# Patient Record
Sex: FEMALE | Race: BLACK OR AFRICAN AMERICAN
[De-identification: names, ages, dates, MRNs, and addresses within clinical notes are randomized per-mention and may not be internally consistent; named-entity substitution may affect disease eponyms.]

---

## 2018-04-22 NOTE — EMERGENCY ROOM REPORT
History of Present Illness


General


Chief Complaint:  Upper Respiratory Illness


Source:  Patient





Present Illness


HPI


28-year-old female with no sig pmhx, p/w cough and chest pain for one week.  

Patient has had a nonproductive cough, now progressed to having chest pain. 

Localized to substernal area, no radiation to back or other areas, sharp in 

nature, gradual in onset, multiple episodes, worsened with taking a deep 

breath.. + Shortness of breath. 


Patient also states that her bilateral lower extremities have been crampy, 

feeling sore as if she has worked out, not one worse than the other, denies any 

swelling of her lower extremities


This is the first occurrence of chest pain. 


Denies fever, chills, cough, abd pain. Denies trauma.


Denies cardiac history, smoking, or family history of cardiac disease at a 

young age.


Patient admits to taking OCPs.  Denies history of DVT/PE, recent surgeries, 

prolonged immobilzation, malignancy.


Allergies:  


Coded Allergies:  


     No Known Allergies (Unverified , 18)





Patient History


Past Medical History:  see triage record


Past Surgical History:  none


Pertinent Family History:  none


Reviewed Nursing Documentation:  PMH: Agreed; PSxH: Agreed





Nursing Documentation-PMH


Past Medical History:  No Stated History





Review of Systems


All Other Systems:  negative except mentioned in HPI





Physical Exam





Vital Signs








  Date Time  Temp Pulse Resp B/P (MAP) Pulse Ox O2 Delivery O2 Flow Rate FiO2


 


18 13:47 98.3 105 20 130/89 97 Room Air  





 98.2       








Sp02 EP Interpretation:  reviewed, normal


General Appearance:  alert, GCS 15, non-toxic, mild distress


Head:  normocephalic, atraumatic


Eyes:  bilateral eye normal inspection, bilateral eye PERRL, bilateral eye EOMI


ENT:  normal ENT inspection, normal pharynx, normal voice, moist mucus membranes


Neck:  normal inspection, full range of motion, supple


Respiratory:  normal inspection, lungs clear, normal breath sounds, no 

respiratory distress, no retraction, no wheezing, speaking full sentences, 

chest symmetrical


Cardiovascular #1:  normal peripheral pulses, no edema, tachycardia


Cardiovascular #2:  2+ radial (R), 2+ radial (L)


Gastrointestinal:  normal inspection, non tender, soft, non-distended, no 

guarding


Musculoskeletal:  other - There is no calf tenderness or swelling, full range 

of motion, no signs of trauma


Neurologic:  normal inspection, alert, oriented x3, responsive, motor strength/

tone normal, sensory intact, normal gait, speech normal


Psychiatric:  normal inspection, judgement/insight normal, memory normal


Skin:  normal inspection, normal color, no rash, warm/dry, well hydrated, 

normal turgor





Medical Decision Making


Diagnostic Impression:  


 Primary Impression:  


 Cough


 Additional Impression:  


 Chest pain


ER Course


28-year-old female with no sig pmhx p/w chest pain





DDX: 


Musculoskeletal CP/costochondritis vs. PE versus pneumothorax vs. gastritis/

GERD 


ACS less likely given age/history





Plan: 


Labs, d-dimer, EKG, CXR, consider CT angiogram





ER course:


Patient has been slightly tachycardic during ED stay, 100-102.  States that she 

is very anxious and she has fear for emergency rooms as it reminds her that her 

grandmother had .  However she is calm and speaking with her sister at 

bedside, no chest pain no shortness of breath.  Labs are normal, chest x-ray 

normal, d-dimer is negative.








Disposition:


Patient will be discharged to home 


Strict precautions discussed with patient on when to emergently return to the ED

: this includes worsening/severe chest pain, palpitations, shortness of breath, 

syncopal episodes, fever or chills, which may indicate severe illness. Patient 

verbalized understanding.


Patient instructed to follow up with their PMD within the next 2 days. Patient 

agrees with plan.





Please note that this Emergency Department Report was dictated using K[employer] technology software, occasionally this can lead to 

erroneous entry secondary to interpretation by the dictation equipment.





EKG Diagnostic Results


EP Interpretation: Yes


Rate: tachy


Rhythm: NSR


ST Segments: No acute changes 


ASA given to patient: no





Rhythm Strip


EP Interpretation: Yes


Rate: 112


Rhythm: NSR, no PVCs, no ectopy








Chest X-ray


CXR: Ordered: Yes


1 view


Indication: Chest pain


EP interpretation: Yes


Interpretation: No consolidation, no effusion, no PTX, no acute cardiopulmonary 

disease


Impression: No acute disease





Electronically signed by Kelli Keys MD





Laboratory Tests








Test


  18


14:00 18


14:15


 


Urine Color Pale yellow   


 


Urine Appearance Clear   


 


Urine pH 6 (4.5-8.0)   


 


Urine Specific Gravity


  1.010


(1.005-1.035) 


 


 


Urine Protein


  Negative


(NEGATIVE) 


 


 


Urine Glucose (UA)


  Negative


(NEGATIVE) 


 


 


Urine Ketones


  Negative


(NEGATIVE) 


 


 


Urine Occult Blood


  2+ (NEGATIVE)


H 


 


 


Urine Nitrite


  Negative


(NEGATIVE) 


 


 


Urine Bilirubin


  Negative


(NEGATIVE) 


 


 


Urine Urobilinogen


  Normal MG/DL


(0.0-1.0) 


 


 


Urine Leukocyte Esterase


  Negative


(NEGATIVE) 


 


 


Urine RBC


  2-4 /HPF (0 -


2)  H 


 


 


Urine WBC


  0-2 /HPF (0 -


2) 


 


 


Urine Squamous Epithelial


Cells Occasional


/LPF 


 


 


Urine Bacteria


  Occasional


/HPF (NONE) 


 


 


Urine HCG, Qualitative


  Negative


(NEGATIVE) 


 


 


White Blood Count


  


  8.4 K/UL


(4.8-10.8)


 


Red Blood Count


  


  4.78 M/UL


(4.20-5.40)


 


Hemoglobin


  


  14.0 G/DL


(12.0-16.0)


 


Hematocrit


  


  42.0 %


(37.0-47.0)


 


Mean Corpuscular Volume  88 FL (80-99)  


 


Mean Corpuscular Hemoglobin


  


  29.3 PG


(27.0-31.0)


 


Mean Corpuscular Hemoglobin


Concent 


  33.3 G/DL


(32.0-36.0)


 


Red Cell Distribution Width


  


  12.0 %


(11.6-14.8)


 


Platelet Count


  


  335 K/UL


(150-450)


 


Mean Platelet Volume


  


  6.8 FL


(6.5-10.1)


 


Neutrophils (%) (Auto)


  


  73.1 %


(45.0-75.0)


 


Lymphocytes (%) (Auto)


  


  18.5 %


(20.0-45.0)  L


 


Monocytes (%) (Auto)


  


  6.9 %


(1.0-10.0)


 


Eosinophils (%) (Auto)


  


  0.4 %


(0.0-3.0)


 


Basophils (%) (Auto)


  


  1.0 %


(0.0-2.0)


 


Prothrombin Time


  


  10.0 SEC


(9.30-11.50)


 


Prothrombin Time INR  1.0 (0.9-1.1)  


 


PTT


  


  26 SEC (23-33)


 


 


D-Dimer


  


  0.21 mg/L FEU


(0.00-0.49)


 


Sodium Level


  


  141 MMOL/L


(136-145)


 


Potassium Level


  


  3.4 MMOL/L


(3.5-5.1)  L


 


Chloride Level


  


  104 MMOL/L


()


 


Carbon Dioxide Level


  


  24 MMOL/L


(21-32)


 


Anion Gap


  


  13 mmol/L


(5-15)


 


Blood Urea Nitrogen


  


  12 mg/dL


(7-18)


 


Creatinine


  


  0.8 MG/DL


(0.55-1.30)


 


Estimate Glomerular


Filtration Rate 


  > 60 mL/min


(>60)


 


Glucose Level


  


  112 MG/DL


()  H


 


Calcium Level


  


  9.7 MG/DL


(8.5-10.1)


 


Total Bilirubin


  


  0.4 MG/DL


(0.2-1.0)


 


Aspartate Amino Transferase


(AST) 


  12 U/L (15-37)


L


 


Alanine Aminotransferase (ALT)


  


  25 U/L (12-78)


 


 


Alkaline Phosphatase


  


  86 U/L


()


 


Troponin I


  


  0.000 ng/mL


(0.000-0.056)


 


Pro-B-Type Natriuretic Peptide


  


  < 5 pg/mL


(0-125)


 


Total Protein


  


  8.5 G/DL


(6.4-8.2)  H


 


Albumin


  


  4.1 G/DL


(3.4-5.0)


 


Globulin  4.4 g/dL  


 


Albumin/Globulin Ratio


  


  0.9 (1.0-2.7)


L











Last Vital Signs








  Date Time  Temp Pulse Resp B/P (MAP) Pulse Ox O2 Delivery O2 Flow Rate FiO2


 


18 13:47 98.3 105 20 130/89 97 Room Air  





 98.2       








Disposition:  HOME, SELF-CARE


Condition:  Improved


Scripts


No Active Prescriptions or Reported Meds











Kelli Keys M.D. 2018 14:07

## 2020-03-13 ENCOUNTER — HOSPITAL ENCOUNTER (EMERGENCY)
Dept: HOSPITAL 72 - EMR | Age: 30
Discharge: HOME | End: 2020-03-13
Payer: MEDICAID

## 2020-03-13 VITALS — SYSTOLIC BLOOD PRESSURE: 145 MMHG | DIASTOLIC BLOOD PRESSURE: 94 MMHG

## 2020-03-13 VITALS — DIASTOLIC BLOOD PRESSURE: 94 MMHG | SYSTOLIC BLOOD PRESSURE: 145 MMHG

## 2020-03-13 VITALS — BODY MASS INDEX: 39.14 KG/M2 | HEIGHT: 72 IN | WEIGHT: 289 LBS

## 2020-03-13 DIAGNOSIS — J02.9: Primary | ICD-10-CM

## 2020-03-13 PROCEDURE — 99281 EMR DPT VST MAYX REQ PHY/QHP: CPT

## 2020-08-17 NOTE — NUR
ER DISCHARGE NOTE:

Patient is cleared to be discharged per ERMD, pt is aox4, on room air, with 
stable vital signs. pt was given dc and prescription instructions, pt was able 
to verbalize understanding, pt id band and iv site removed without 
complications. pt is able to ambulate with steady gait. pt took all belongings. Statement Selected

## 2021-02-20 ENCOUNTER — HOSPITAL ENCOUNTER (EMERGENCY)
Dept: HOSPITAL 72 - EMR | Age: 31
Discharge: HOME | End: 2021-02-20
Payer: MEDICAID

## 2021-02-20 VITALS — SYSTOLIC BLOOD PRESSURE: 133 MMHG | DIASTOLIC BLOOD PRESSURE: 74 MMHG

## 2021-02-20 VITALS — HEIGHT: 72 IN | BODY MASS INDEX: 27.09 KG/M2 | WEIGHT: 200 LBS

## 2021-02-20 DIAGNOSIS — R06.00: Primary | ICD-10-CM

## 2021-02-20 PROCEDURE — 99281 EMR DPT VST MAYX REQ PHY/QHP: CPT

## 2021-02-20 NOTE — NUR
ED Nurse Note: Patient came to the ED complaining of SOB, mild dry cough and 
generalized muscle aches since yesterday. Denies chest pain, fever.

## 2021-02-20 NOTE — EMERGENCY ROOM REPORT
History of Present Illness


General


Chief Complaint:  Dyspnea/Respdistress


Source:  Patient





Present Illness


HPI


This is a 30-year-old female with no past medical history.  She presents with 

chief plaint of shortness of breath.  Her son is here also for the same thing.  

Onset tonight.  No fever chills but no nausea no vomiting.  No cough or 

congestion.  She has a history of asthma as a kid.


Allergies:  


Coded Allergies:  


     No Known Allergies (Unverified , 4/22/18)





COVID-19 Screening


Contact w/high risk pt:  No


Experienced COVID-19 symptoms?:  Yes


COVID-19 Testing performed PTA:  No





Patient History


Past Medical History:  see triage record, old chart reviewed


Past Surgical History:  none


Pertinent Family History:  none


Social History:  Denies: smoking


Last Menstrual Period:  irregular


Pregnant Now:  No


Immunizations:  other


Reviewed Nursing Documentation:  PMH: Agreed; PSxH: Agreed





Nursing Documentation-PMH


Past Medical History:  No Stated History





Review of Systems


Eye:  Denies: eye pain, blurred vision


ENT:  Denies: ear pain, nose congestion, throat swelling


Respiratory:  Reports: shortness of breath; Denies: cough


Cardiovascular:  Denies: chest pain, palpitations


Gastrointestinal:  Denies: abdominal pain, diarrhea, nausea, vomiting


Musculoskeletal:  Denies: back pain, joint pain


Skin:  Denies: rash


Neurological:  Denies: headache, numbness


Endocrine:  Denies: increased thirst, increased urine


Hematologic/Lymphatic:  Denies: easy bruising


All Other Systems:  negative except mentioned in HPI





Physical Exam





Vital Signs








  Date Time  Temp Pulse Resp B/P (MAP) Pulse Ox O2 Delivery O2 Flow Rate FiO2


 


2/20/21 02:31 98.1 92 18 133/74 (93) 97 Room Air  





Vitals normal


Sp02 EP Interpretation:  reviewed, normal


General Appearance:  well appearing, no apparent distress, alert


Head:  normocephalic, atraumatic


Eyes:  bilateral eye PERRL, bilateral eye EOMI


ENT:  hearing grossly normal, normal pharynx


Neck:  full range of motion, supple, no meningismus


Respiratory:  chest non-tender, lungs clear, normal breath sounds


Cardiovascular #1:  regular rate, rhythm, no murmur


Gastrointestinal:  normal bowel sounds, non tender, no mass, no organomegaly, no

bruit, non-distended


Musculoskeletal:  back normal, normal range of motion, gait/station normal


Psychiatric:  mood/affect normal





Medical Decision Making


Diagnostic Impression:  


   Primary Impression:  


   Dyspnea


   Qualified Codes:  R06.00 - Dyspnea, unspecified


ER Course


Presents with shortness of breath.  She looks well.  No evidence of ACS, PE, 

dissection to name a few.  Suspect that is beginning of a small viral illness.  

Will discharge home.





Last Vital Signs








  Date Time  Temp Pulse Resp B/P (MAP) Pulse Ox O2 Delivery O2 Flow Rate FiO2


 


2/20/21 02:31 98.1 92 18 133/74 (93) 97 Room Air  








Status:  unchanged


Disposition:  HOME, SELF-CARE


Condition:  Stable


Scripts


No Active Prescriptions or Reported Meds


Patient Instructions:  Shortness of Breath, Easy-to-Read





Additional Instructions:  


Follow-up with your doctor in 7 days.  Return if symptoms worsen.











Sandro Covarrubias MD                  Feb 20, 2021 02:39

## 2021-02-20 NOTE — NUR
ER DISCHARGE NOTE:

Patient is cleared to be discharged per ERMD, pt is aox4, on room air, with 
stable vital signs. pt was given dc instructions, pt was able to verbalize 
understanding, pt id band  removed without complications. pt is able to 
ambulate with steady gait. pt took all belongings.